# Patient Record
Sex: FEMALE | Race: WHITE
[De-identification: names, ages, dates, MRNs, and addresses within clinical notes are randomized per-mention and may not be internally consistent; named-entity substitution may affect disease eponyms.]

---

## 2022-01-25 ENCOUNTER — HOSPITAL ENCOUNTER (OUTPATIENT)
Dept: PHYSICAL THERAPY | Facility: CLINIC | Age: 47
Setting detail: THERAPIES SERIES
Discharge: HOME | End: 2022-01-25
Payer: COMMERCIAL

## 2022-01-25 PROCEDURE — 97161 PT EVAL LOW COMPLEX 20 MIN: CPT

## 2022-01-25 PROCEDURE — 97140 MANUAL THERAPY 1/> REGIONS: CPT

## 2022-01-25 PROCEDURE — 97110 THERAPEUTIC EXERCISES: CPT

## 2022-01-25 NOTE — CONSULTS
[x] [address]   Outpatient Rehabilitation &  Therapy  [address]  P: [phone]  F: [phone] [] [address]  P: [phone]  F: [phone] [] [address]  [facility]   [place]: [phone]  F: [phone]         Physical Therapy Running Evaluation    Date:  2022  Patient: Janneth Lund   : 1975  MRN: [id number]  Physician: Dr. Armas Job: [facility]  Medical Diagnosis:  L plantar Fasciitis  Rehab Codes: Onset date:  21   Next 's appt.: unknown    Subjective:   CC: Per Dr. Foy Home note. Patient states L foot pain since 2021. Patient states she did not injure foot. patient stands all day at work. patient states pain is keeping her from exercise. Patient is doing stretches for foot. Patient is taking advil sometimes for pain. Patient states pain is worse in morning but is constant. Today: Pt reports she had an injection. Mild relief but that has since worn off. Wearing night splint and that has helped some. Still pretty painful in the AM and as the day progresses. 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  Got custom orthotics and wear the in the wok shoes  Next goal race: was supposed do to [facility]    Pain:  [x] Yes  [] No   Location:  L foot Pain Rating: (0-10 scale) Pain altered Tx:  [] Yes  [x] No  Action:  Symptoms:  [] Improving [] Worsening [x] Same  Better:  [] Meds    [] Ice pack    [] Sit    [x] Not running  []Stand    [] Walk    [x] Stretching   [] Other:  Worse: [x] Run    [] Easy    [] Speed work    [x]Stand    [x] Walk    [] Stairs    [] Sit    [] Other:  Sleep: [x] OK    [] Disturbed    Objective:    ROM  ° A/P STRENGTH    Left Right Left Right   Hip Flex WNL WNL     Ext WNL WNL 4- 4   ER WNL WNL 4+ 5   IR WNL WNL     ABD WNL WNL 4 4+   Knee Flex WNL WNL 5 5   Ext WNL WNL     Ankle DF knee straight 0 10     PF WNL WNL     INV WNL WNL     EVER WNL WNL     GTE 50% limitation 40% liitation         OBSERVATION No Deficit Deficit Not Tested Comments   Palpation [] [x] [] Fibrotic L PF, gastroc and soleus spasm, tender medial calcaneal tubercle and center of heel   Gait [] [x] [] Analysis:  Antalgic with early heel off and decreased stance time on L       FUNCTIONAL TESTS  PAIN NO PAIN COMMENTS   Posterior tibialisdysfunction [x] [] Unknown too much pain to complete. Pt does have valgus collapse at midfoot   # of 1 legged calf raises   Too much pain to complete                         Functional Test: UWRI Score: 81% functionally impaired     Comments:  Assessment:Patient would benefit from skilled physical therapy services in order to: Increase ankle DF, GTE as well as hip and calf strength to correct LE mechanics and allow pt to return to run when cleared by Dr. Franco Yusuf  Problems:    [x] ? Pain:     [x] ? ROM:    [x] ? Strength:    [x] ? Function: Not able to run as she wishes   [] ?  Balance  [] Increased edema:  [] Postural Deviations  [x] Gait Deviations  [x]  UWRI score is 7/36  [] Other:      STG: (to be met in 8 treatments)  1. ? Pain: 3/10 pain foot to allow pt to start return to run  2. ? ROM:Ankle DF 10 deg knee straight and normal GTE to allow for normal LE mechanics  3. ? Strength: 5/5 hip strength, pt able to perform 20 reps of single leg HR to aide in correct LE mechanics  4. Pt to be able to walk normal and stand at work with 3/10 pain or less  5. Independent with Home Exercise Programs    LTG: (to be met in 16 treatments)  1. No pain L foot with running or cycling  2. Pt able to demonstrate acceptable mechanics with fwd impact lunge in a glute dominant fashion to aide in acceptable run mechanics  3. UWRI score to 30/36  4. Able to run as she wishes                    Patient goals:relieve pain so I can get back to training    Rehab Potential:  [x] Good  [] Fair  [] Poor   Suggested Professional Referral:  [x] No  [] Yes:  Barriers to Goal Achievement[de-identified]  [x] No  [] Yes:  Domestic Concerns:  [x] No  [] Yes:    Pt. Education:  [x] Plans/Goals, Risks/Benefits discussed  [x] Home exercise program    Method of Education: [x] Verbal  [x] Demo  [x] Written  Access Code: [email or web address]  URL: [email or web address]  Date: 01/25/2022  Prepared by: Kandi Buoy    Exercises  Seated Great Toe Extension - 1 x daily - 7 x weekly - 3 sets - 10 reps  Seated Lesser Toes Extension - 1 x daily - 7 x weekly - 3 sets - 10 reps  Arch Lifting - 1-2 x daily - 7 x weekly - 10 reps - 10sec hold  Supine Gastroc Stretch - 1-2 x daily - 7 x weekly - 1 sets - 2 reps - 1' hold  Gastroc Stretch on Wall - 1-2 x daily - 7 x weekly - 2 reps - 1' hold  Standing Soleus Stretch - 1-2 x daily - 7 x weekly - 2 reps - 1' hold  Seated Great Toe Extension - 1-2 x daily - 7 x weekly - 10 reps  Seated Heel Raise - 1-2 x daily - 7 x weekly - 3 sets - 10 reps    Comprehension of Education:  [x] Verbalizes understanding. [] Demonstrates understanding. [] Needs Review. [] Demonstrates/verbalizes understanding of HEP/Ed previously given.     Treatment Plan:  [x] Therapeutic Exercise    [x] Therapeutic Activity  [x] Manual Therapy   [x] Alter G treadmill  [x] Phys perf test     [x] Vasocompression/Game Ready   [x] Neuromuscular Re-education [x] Instruction in HEP       Frequency:  1-2x/week for 16 visits    Todays Treatment:  Manual:Hypervolt calf trigger points,MFR PF  Precautions: standard  Exercises:  Exercise  L Plantar Fascia Reps/ Time Weight/ Level Issued for HEP  Comments   Toe yoga x30  x x    Foot dome 10x10\"  x x    Burrito calf stretch 1'ea  x x gastroc and soleus   Strap calf stretch 1'  x x    Contract relax GTE 10x10\"  x x    Seated calf raise x20 35# x x                                                                                                                            Other:    Specific Instructions for next treatment: Hip strengthening, calf raises     Treatment Charges: Mins Units   [x] Evaluation       [x]  Low       []  Moderate       []  High 15 1   [] Phys perf test     [x]  Ther Exercise 20 1   [x]  Manual Therapy 15 1   []  Ther Activities     []  Aquatics     []  Vasocompression     []  NMR       TOTAL TREATMENT TIME: 50    Time in: 7455  Time Out:1805    Electronically signed by: Rafael Malave PT        Physician Signature:________________________________Date:__________________  By signing above or cosigning this note, I have reviewed this plan of care and certify a need for medically necessary rehabilitation services.      *PLEASE SIGN ABOVE AND FAX BACK ALL PAGES*

## 2022-01-27 ENCOUNTER — HOSPITAL ENCOUNTER (OUTPATIENT)
Dept: PHYSICAL THERAPY | Facility: CLINIC | Age: 47
Setting detail: THERAPIES SERIES
Discharge: HOME | End: 2022-01-27
Payer: COMMERCIAL

## 2022-01-27 PROCEDURE — 97110 THERAPEUTIC EXERCISES: CPT

## 2022-01-27 PROCEDURE — 97140 MANUAL THERAPY 1/> REGIONS: CPT

## 2022-01-27 NOTE — FLOWSHEET NOTE
[] [facility]) [facility] &  Therapy  [address]  [phone]  F: [phone] [] [address]  [address]   [address]  P: [phone]  F: [phone] [] [address] &  Therapy  [address]  P: [phone]  F: [phone] [] [address]  P: [phone]  F: [phone] [] [address]  [facility]   [address]   [place]: [phone]  F: [phone]      Physical Therapy Daily Treatment Note    Date:  2022  Patient Name:  Mandi Luz    :  1975  MRN: [id number]  Physician: Dr. Pavel Patrick: [facility] Diagnosis:   L plantar Fasciitis                    Rehab Codes: B88.703, R26.89  Onset date:    21                                    Next 's appt.: unknown  Visit# / total visits:     Cancels/No Shows: 0    Subjective:    Pain:  [x] Yes  [] No Location: L foot Pain Rating: (0-10 scale) 6/10  Pain altered Tx:  [x] No  [] Yes  Action:  Comments:I'm not as sore today but I haven't been on my feet as much.  Lower calf is in a knot  Objective:  Manual:Hypervolt calf trigger points  Precautions: standard  Exercises:  Exercise  L Plantar Fascia Reps/ Time Weight/ Level Issued for HEP   Comments   Toe yoga x30    x     Foot dome 10x10\"   x x     Burrito calf stretch 1'ea   x  gastroc and soleus   Strap calf stretch 3x1'   x x     Contract relax GTE 10x10\"   x x     Seated calf raise x20 35# x x      Resisted toe flex  x30 ea  orange x   x  1st, 2-5, 5th   Resisted supination  x20  lime  x  x      Sidelying hip abduction  2 sets    x  x  metronome at 90                                                                                                                                                                           Other:     Specific Instructions for next treatment: Wok into standing exercises if able         Treatment Charges: Mins Units   []  Modalities     [x]  Ther Exercise 30 2   [x]  Manual Therapy 15 1   []  Ther Activities     []  Aquatics     []  Vasocompression     []  Other     Total Treatment time 45 3       Assessment: [x] Progressing toward goals. PF is more pliable. GTE is normal. Gastroc and soleus are still in spasm with tenderness and limited DF available. Poor eccentric control of ponation Worked on this with resisted supination and attempted to improve propriocepion of what ankle neutral is.    [] No change. [] Other:  [] Patient would continue to benefit from skilled physical therapy services in order to: Increase ankle DF, GTE as well as hip and calf strength to correct LE mechanics and allow pt to return to run when cleared by Dr. Mustapha Benítez    STG: (to be met in 8 treatments)  1. ? Pain: 3/10 pain foot to allow pt to start return to run  2. ? ROM:Ankle DF 10 deg knee straight and normal GTE to allow for normal LE mechanics  3. ? Strength: 5/5 hip strength, pt able to perform 20 reps of single leg HR to aide in correct LE mechanics  4. Pt to be able to walk normal and stand at work with 3/10 pain or less  5. Independent with Home Exercise Programs     LTG: (to be met in 16 treatments)  1. No pain L foot with running or cycling  2. Pt able to demonstrate acceptable mechanics with fwd impact lunge in a glute dominant fashion to aide in acceptable run mechanics  3. UWRI score to 30/36  4. Able to run as she wishes                     Patient goals:relieve pain so I can get back to training    Pt. Education:  [x] Yes  [] No  [x] Reviewed Prior HEP/Ed  Method of Education: [x] Verbal  [x] Demo  [] Written  Comprehension of Education:  [] Verbalizes understanding. [] Demonstrates understanding. [] Needs review.   [] Demonstrates/verbalizes HEP/Ed previously given.     Plan: [x] Continue current frequency toward long and short term goals.     [] Specific Instructions for subsequent treatments:       Time In:1308            Time Out: 1336    Electronically signed by:  Cortney Pascal PT

## 2022-02-03 ENCOUNTER — HOSPITAL ENCOUNTER (OUTPATIENT)
Dept: PHYSICAL THERAPY | Facility: CLINIC | Age: 47
Setting detail: THERAPIES SERIES
End: 2022-02-03
Payer: COMMERCIAL

## 2022-02-08 ENCOUNTER — HOSPITAL ENCOUNTER (OUTPATIENT)
Dept: PHYSICAL THERAPY | Facility: CLINIC | Age: 47
Setting detail: THERAPIES SERIES
Discharge: HOME | End: 2022-02-08
Payer: COMMERCIAL

## 2022-02-08 PROCEDURE — 97140 MANUAL THERAPY 1/> REGIONS: CPT

## 2022-02-08 PROCEDURE — 97110 THERAPEUTIC EXERCISES: CPT

## 2022-02-08 NOTE — FLOWSHEET NOTE
[] [address]  [address]  [phone]  F: [phone] [] [address]  [address]   [address]  P: [phone]  F: [phone] [] [address]  Therapy  [address] [facility]  P: [phone]  F: [phone] [] [address]  P: [phone]  F: [phone] [] [address]  [facility]   [address]   [place]: [phone]  F: [phone]      Physical Therapy Daily Treatment Note    Date:  2022  Patient Name:  Juan F Yeboah    :  1975  MRN: [id number]  Physician: Dr. Juve [place]: [address] Diagnosis:   L plantar Fasciitis                    Rehab Codes: K84.390, R26.89  Onset date:    21                                    Next Dr's appt.: unknown  Visit# / total visits: 3/ 16    Cancels/No Shows: 0    Subjective:    Pain:  [x] Yes  [] No Location: L foot Pain Rating: (0-10 scale) 6/10  Pain altered Tx:  [x] No  [] Yes  Action:  Comments: Some times of less pain but still end of day and when I get up in AM terrible pain  Objective:  Manual:Hypervolt calf trigger points, MFR to Posterior tib,  QP and PF  Precautions: standard  Exercises:  Exercise  L Plantar Fascia Reps/ Time Weight/ Level Issued for HEP   Comments   Toe yoga x20    x     Foot dome 10x10\"   x x     Burrito calf stretch 1'ea   x  gastroc and soleus   Strap calf stretch 3x1'   x      Contract relax GTE 10x10\"   x x     Seated calf raise x20 35# x       Resisted toe flex  x30 ea  orange x   x  1st, 2-5, 5th   Resisted supination  x20  lime  x  x  standing    Sidelying hip abduction  2 sets    x    metronome at 90    DL heel raise  2x10    x  x  standing, cue to relax toes and not curl                                                                                                                                                             Other:     Specific Instructions for next treatment: Work into standing exercises if able         Treatment Charges: Mins Units   []  Modalities     [x]  Ther Exercise 30 2   [x]  Manual Therapy 15 1   []  Ther Activities     []  Aquatics     []  Vasocompression     []  Other     Total Treatment time 45 3       Assessment: [x] Progressing toward goals. Able to hold ankle in neutral while doing foot intrinsic exercises. Better control of great toe and lesser toes. Still living in overpronated position at foot. Added heel raises this date. First couple of reps pt had great difficulty controlling ankle and spins off onto lesser toes. Pt then able to keep ankle in neutral. Would like to be able to work out of shoes with inserts for calf raises. Pt is very painful with standing at this time though. [] No change. [] Other:  [] Patient would continue to benefit from skilled physical therapy services in order to: Increase ankle DF, GTE as well as hip and calf strength to correct LE mechanics and allow pt to return to run when cleared by Dr. Piyush Chong    STG: (to be met in 8 treatments)  1. ? Pain: 3/10 pain foot to allow pt to start return to run  2. ? ROM:Ankle DF 10 deg knee straight and normal GTE to allow for normal LE mechanics  3. ? Strength: 5/5 hip strength, pt able to perform 20 reps of single leg HR to aide in correct LE mechanics  4. Pt to be able to walk normal and stand at work with 3/10 pain or less  5. Independent with Home Exercise Programs     LTG: (to be met in 16 treatments)  1. No pain L foot with running or cycling  2. Pt able to demonstrate acceptable mechanics with fwd impact lunge in a glute dominant fashion to aide in acceptable run mechanics  3. UWRI score to 30/36  4.  Able to run as she wishes                     Patient goals:relieve pain so I can get back to training    Pt. Education:  [x] Yes  [] No  [x] Reviewed Prior HEP/Ed  Method of Education: [x] Verbal  [x] Demo  [] Written  Comprehension of Education:  [] Verbalizes understanding. [] Demonstrates understanding. [] Needs review. [] Demonstrates/verbalizes HEP/Ed previously given. Plan: [x] Continue current frequency toward long and short term goals.     [] Specific Instructions for subsequent treatments:       Time In:1810            Time Out: 1900    Electronically signed by:  Dottie Guerra PT

## 2022-02-15 ENCOUNTER — HOSPITAL ENCOUNTER (OUTPATIENT)
Dept: PHYSICAL THERAPY | Facility: CLINIC | Age: 47
Setting detail: THERAPIES SERIES
Discharge: HOME | End: 2022-02-15
Payer: COMMERCIAL

## 2022-02-15 PROCEDURE — 97140 MANUAL THERAPY 1/> REGIONS: CPT

## 2022-02-15 PROCEDURE — 97110 THERAPEUTIC EXERCISES: CPT

## 2022-02-15 PROCEDURE — 97016 VASOPNEUMATIC DEVICE THERAPY: CPT

## 2022-02-15 NOTE — FLOWSHEET NOTE
[] [facility]) - [facility] &  Therapy  [address]  [phone]  F: [phone] [] [address]  [address] [facility]   [address]  P: [phone]  F: [phone] [x] [address] &  Therapy  [address] [facility]  P: [phone]  F: [phone] [] [address]  P: [phone]  F: [phone] [] [address]  [facility]   [address]   [place]: [phone]  F: [phone]      Physical Therapy Daily Treatment Note    Date:  2/15/2022  Patient Name:  Mone Anguiano    :  1975  MRN: [id number]  Physician: Dr. Wilfrid Rea: [address]  Medical Diagnosis:   L plantar Fasciitis                    Rehab Codes: B11.521, R26.89  Onset date:    21                                    Next 's appt.: unknown  Visit# / total visits:     Cancels/No Shows: 0    Subjective:    Pain:  [x] Yes  [] No Location: L foot Pain Rating: (0-10 scale) 6/10  Pain altered Tx:  [x] No  [] Yes  Action:  Comments: Pt arrived in severe pain this date. She reports she forgot to wear her night splint and woke up in the AM very painful.  States that it really was feeling better prior to this  Objective:  Manual:Hypervolt calf trigger points, MFR to Posterior tib,  QP and PF  Precautions: standard  Exercises:  Exercise  L Plantar Fascia Reps/ Time Weight/ Level Issued for HEP   Comments   Toe yoga x20    x     Foot dome 10x10\"   x x     Burrito calf stretch 1'ea   x  gastroc and soleus   Strap calf stretch 3x1'   x x     Contract relax GTE 10x10\"   x x     Seated calf raise x20 35# x x      Resisted toe flex  x30 ea  orange x   x  1st, 2-5, 5th   Resisted supination  x20  lime  x  x  standing    Sidelying hip abduction  2 sets    x    metronome at 90    DL heel raise  2x10    x    standing, cue to relax toes and not curl                                                                                                                                                             Other:Vasocompression 34 deg medium pressure to foot and calf x15 min     Specific Instructions for next treatment: Work into standing exercises if able   First 1/2 of treatment performed by this writer while second half was performed by Guillermo Crawford PTA      Treatment Charges: Mins Units   []  Modalities     [x]  Ther Exercise 30 2   [x]  Manual Therapy 15 1   []  Ther Activities     []  Aquatics     [x]  Vasocompression 15 1   []  Other     Total Treatment time 60 4       Assessment: [x] Progressing toward goals. Pt was much tighter and more tender throughout calf and foot this date. She was tearful because she was so tender. Resting position on L vs R is on increased PF due to tissue being so tight. Pt is also visibly swollen at L posterior tib. [] No change. [] Other:  [] Patient would continue to benefit from skilled physical therapy services in order to: Increase ankle DF, GTE as well as hip and calf strength to correct LE mechanics and allow pt to return to run when cleared by Dr. Mustapha Benítez    STG: (to be met in 8 treatments)  1. ? Pain: 3/10 pain foot to allow pt to start return to run  2. ? ROM:Ankle DF 10 deg knee straight and normal GTE to allow for normal LE mechanics  3. ? Strength: 5/5 hip strength, pt able to perform 20 reps of single leg HR to aide in correct LE mechanics  4. Pt to be able to walk normal and stand at work with 3/10 pain or less  5. Independent with Home Exercise Programs     LTG: (to be met in 16 treatments)  1. No pain L foot with running or cycling  2. Pt able to demonstrate acceptable mechanics with fwd impact lunge in a glute dominant fashion to aide in acceptable run mechanics  3. UWRI score to 30/36  4.  Able to run as she wishes     Patient goals:relieve pain so I can get back to training    Pt. Education:  [x] Yes  [] No  [x] Reviewed Prior HEP/Ed  Method of Education: [x] Verbal  [x] Demo  [] Written  Comprehension of Education:  [] Verbalizes understanding. [] Demonstrates understanding. [] Needs review. [] Demonstrates/verbalizes HEP/Ed previously given. Plan: [x] Continue current frequency toward long and short term goals.     [] Specific Instructions for subsequent treatments:       Time In:1700            Time Out: 1800    Electronically signed by:  Kale Veronica PT

## 2022-02-15 NOTE — FLOWSHEET NOTE
[] [facility]) - [facility] &  Therapy  [address]  [phone]  F: [phone] [] [address]  [address]   [address]  P: [phone]  F: [phone] [x] [address] &  Therapy  [address] [facility]  P: [phone]  F: [phone] [] [address]  P: [phone]  F: [phone] [] [address]  [facility]   [address]   [place]: [phone]  F: [phone]      Physical Therapy Daily Treatment Note    Date:  2/15/2022  Patient Name:  Cayetano Berger    :  1975  MRN: [id number]  Physician: Dr. Chantal Ortiz: [facility] Diagnosis:   L plantar Fasciitis                    Rehab Codes: Z08.852, R26.89  Onset date:    21                                    Next 's appt.: unknown  Visit# / total visits:     Cancels/No Shows: 0    Subjective:    Pain:  [x] Yes  [] No Location: L foot Pain Rating: (0-10 scale) -/10  Pain altered Tx:  [x] No  [] Yes  Action:  Comments: Pt notes inc pain with visible swelling and TTP throughout posterior tib mm.      Objective:  Manual:Hypervolt calf trigger points, MFR to Posterior tib,  QP and PF  Precautions: standard  Exercises:  Exercise  L Plantar Fascia Reps/ Time Weight/ Level Issued for HEP   Comments   Toe yoga x20    x     Foot dome 10x10\"   x x     Burrito calf stretch 1'ea   x  gastroc and soleus   Strap calf stretch 3x1'   x      Contract relax GTE 10x10\"   x x     Seated calf raise x20 35# x x      Resisted toe flex  x30 ea  orange x  x  1st, 2-5, 5th   Resisted supination  x20  lime  x x  standing    Sidelying hip abduction  2 sets    x    metronome at 90    DL heel raise  2x10    x    standing, cue to relax toes and not curl                                                                                                                                                             Other: Vaso Ankle/knee sleeve, 10m 34* medium pressure      Specific Instructions for next treatment: Work into standing exercises if able         Treatment Charges: Mins Units   []  Modalities     [x]  Ther Exercise 15 1   [x]  Manual Therapy     []  Ther Activities     []  Aquatics     [x]  Vasocompression 10 1   []  Other     Total Treatment time 25 2       Assessment: [x] Progressing toward goals. Able to inc coordination of 5th resisted toe flexion with cues to allow passive extension and focus on active flexion. Verbal/tactile cueing provided to improve eccentric control of resisted supination. Concluded tx with vaso to reduce edema and soreness. [] No change. [] Other:  [] Patient would continue to benefit from skilled physical therapy services in order to: Increase ankle DF, GTE as well as hip and calf strength to correct LE mechanics and allow pt to return to run when cleared by Dr. Tim Herrera    STG: (to be met in 8 treatments)  1. ? Pain: 3/10 pain foot to allow pt to start return to run  2. ? ROM:Ankle DF 10 deg knee straight and normal GTE to allow for normal LE mechanics  3. ? Strength: 5/5 hip strength, pt able to perform 20 reps of single leg HR to aide in correct LE mechanics  4. Pt to be able to walk normal and stand at work with 3/10 pain or less  5. Independent with Home Exercise Programs     LTG: (to be met in 16 treatments)  1. No pain L foot with running or cycling  2. Pt able to demonstrate acceptable mechanics with fwd impact lunge in a glute dominant fashion to aide in acceptable run mechanics  3. UWRI score to 30/36  4. Able to run as she wishes                     Patient goals:relieve pain so I can get back to training    Pt.  Education:  [x] Yes  [] No  [x] Reviewed Prior HEP/Ed  Method of Education: [x] Verbal  [x] Demo  [] Written  Comprehension of Education:  [] Verbalizes understanding. [] Demonstrates understanding. [] Needs review. [] Demonstrates/verbalizes HEP/Ed previously given. Plan: [x] Continue current frequency toward long and short term goals.     [] Specific Instructions for subsequent treatments:       Time In: 5:30 pm            Time Out: 5:57 pm    Electronically signed by:  Fernanda Marie PTA

## 2022-02-24 ENCOUNTER — HOSPITAL ENCOUNTER (OUTPATIENT)
Dept: PHYSICAL THERAPY | Facility: CLINIC | Age: 47
Setting detail: THERAPIES SERIES
Discharge: HOME | End: 2022-02-24
Payer: COMMERCIAL

## 2022-02-24 PROCEDURE — 97016 VASOPNEUMATIC DEVICE THERAPY: CPT

## 2022-02-24 PROCEDURE — 97110 THERAPEUTIC EXERCISES: CPT

## 2022-02-24 PROCEDURE — 97140 MANUAL THERAPY 1/> REGIONS: CPT

## 2022-02-24 NOTE — FLOWSHEET NOTE
[] [facility]) [facility] &  Therapy  [address]  [phone]  F: [phone] [] [address]  [facility]   [address]  P: [phone]  F: [phone] [x] [address] &  Therapy  [address]  P: [phone]  F: [phone] [] [address]  P: [phone]  F: [phone] [] [address]  [facility]   [address]   [place]: [phone]  F: [phone]      Physical Therapy Daily Treatment Note    Date:  2022  Patient Name:  Yuridia Goldberg    :  1975  MRN: [id number]  Physician: Dr. Obed Rehman: Joaquin Ruffin  Medical Diagnosis:   L plantar Fasciitis                    Rehab Codes: D15.564, R26.89  Onset date:    21                                    Next Dr's appt.: unknown  Visit# / total visits:     Cancels/No Shows: 0    Subjective:    Pain:  [x] Yes  [] No Location: L foot Pain Rating: (0-10 scale) 2/10  Pain altered Tx:  [x] No  [] Yes  Action:  Comments:Patient reports feeling much better than last session. She is having mild dull ache with foot muscle tightness no shooting sharp pain. Patient reports that she has been completing heel raises barefoot and is not having pain.     Objective:  Manual:Hypervolt calf trigger points, MFR to Posterior tib,  QP and PF  Precautions: standard  Exercises:  Exercise  L Plantar Fascia Reps/ Time Weight/ Level Issued for HEP   Comments   Toe yoga x20    x  standing   Foot dome 10x10\"   x x standing   Burrito calf stretch 1'ea   x x gastroc and soleus   Strap calf stretch 3x1'   x      Contract relax GTE 10x10\"   x x     Seated calf raise x20 35# x x     Resisted toe flex  3\"x30 ea  orange x   x  1st, 2-5, 5th 5 standing   Resisted supination  x20  lime  x  x  standing    Sidelying hip abduction  2 sets    x    metronome at 90    DL heel raise  2x10    x  x  standing, cue to relax toes and not curl                                 Other:Vasocompression 34 deg medium pressure to foot and calf x15 min     Specific Instructions for next treatment: Work into standing exercises if able     Treatment Charges: Mins Units   []  Modalities     [x]  Ther Exercise 30 2   [x]  Manual Therapy 15 1   []  Ther Activities     []  Aquatics     [x]  Vasocompression 15 1   []  Other     Total Treatment time 60 4       Assessment: [x] Progressing toward goals. Patient demonstrated increased tightness in medial gastroc and posterior tib compared to R. Continued with above foot stabilization exercises completing half of each exercise in standing  with VC's to maintain tripod foot position. Patient reported minimal soreness but overall felt good. Reviewed heel raises with patient as she progressed to barefoot. VC's were given to only lift as high as she is able to maintain extended toes and maintain wt through all METS. Continued with vaso to prevent tissue irritation    [] No change. [] Other:  [] Patient would continue to benefit from skilled physical therapy services in order to: Increase ankle DF, GTE as well as hip and calf strength to correct LE mechanics and allow pt to return to run when cleared by Dr. Julian Parker    STG: (to be met in 8 treatments)  1. ? Pain: 3/10 pain foot to allow pt to start return to run  2. ? ROM:Ankle DF 10 deg knee straight and normal GTE to allow for normal LE mechanics  3. ? Strength: 5/5 hip strength, pt able to perform 20 reps of single leg HR to aide in correct LE mechanics  4. Pt to be able to walk normal and stand at work with 3/10 pain or less  5. Independent with Home Exercise Programs     LTG: (to be met in 16 treatments)  1. No pain L foot with running or cycling  2.  Pt able to demonstrate acceptable mechanics with fwd impact lunge in a glute dominant fashion to aide in acceptable run mechanics  3. UWRI score to 30/36  4. Able to run as she wishes                     Patient goals:relieve pain so I can get back to training    Pt. Education:  [x] Yes  [] No  [x] Reviewed Prior HEP/Ed  Method of Education: [x] Verbal  [x] Demo  [] Written  Comprehension of Education:  [] Verbalizes understanding. [] Demonstrates understanding. [] Needs review. [] Demonstrates/verbalizes HEP/Ed previously given. Plan: [x] Continue current frequency toward long and short term goals.     [] Specific Instructions for subsequent treatments:       Time In:4pm            Time Out: 515pm    Electronically signed by:  Debo Armenta PT

## 2022-03-01 ENCOUNTER — HOSPITAL ENCOUNTER (OUTPATIENT)
Dept: PHYSICAL THERAPY | Facility: CLINIC | Age: 47
Setting detail: THERAPIES SERIES
Discharge: HOME | End: 2022-03-01
Payer: COMMERCIAL

## 2022-03-01 PROCEDURE — 97016 VASOPNEUMATIC DEVICE THERAPY: CPT

## 2022-03-01 PROCEDURE — 97140 MANUAL THERAPY 1/> REGIONS: CPT

## 2022-03-01 PROCEDURE — 97110 THERAPEUTIC EXERCISES: CPT

## 2022-03-01 NOTE — FLOWSHEET NOTE
[] [facility]) [facility] &  Therapy  [address]  [phone]  F: [phone] [] [address]  [employer] [address]   [address]  P: [phone]  F: [phone] [x] [address] &  Therapy  [address] [facility]  P: [phone]  F: [phone] [] [address]  P: [phone]  F: [phone] [] [address]  [facility]   [address]   [place]: [phone]  F: [phone]      Physical Therapy Daily Treatment Note    Date:  3/1/2022  Patient Name:  Soco Brooks    :  1975  MRN: [id number]  Physician: Dr. Daiana Sales: Joaquin Lu 150  Medical Diagnosis:   L plantar Fasciitis                    Rehab Codes: E24.341, R26.89  Onset date:    21                                    Next Dr's appt.: unknown  Visit# / total visits:     Cancels/No Shows: 0    Subjective:    Pain:  [x] Yes  [] No Location: L foot Pain Rating: (0-10 scale) 0-2/10  Pain altered Tx:  [x] No  [] Yes  Action:  Comments:Patient reports feeling much better. Have been doing HEP regularly. Can almost walk normal. Still getting intermittent pain. AM pain much better. Pt reports she went to Dr. Ayo Pacheco and she is cleared to start regular walking.   Objective:  Manual:Hypervolt calf trigger points, MFR to Posterior tib,  QP and PF  Precautions: standard  Exercises:  Exercise  L Plantar Fascia Reps/ Time Weight/ Level Issued for HEP   Comments   Toe yoga x20    x  standing   Foot dome 10x10\"   x x standing   Burrito calf stretch 1'ea   x x gastroc and soleus   Strap calf stretch 3x1'   x      Contract relax GTE 10x10\"   x x     Seated calf raise x20 44# x x     Resisted toe flex  3\"x30 ea  orange x   x  1st, 2-5, 5th 5 standing   Resisted supination  x20  lime  x  x  standing    DL heel raise  2x10    x  x  standing, cue to relax toes and not curl    Hip hike foam roller at wall  2 sets    x  x                     Other:Vasocompression 34 deg medium pressure to foot and calf x15 min     Specific Instructions for next treatment: Work into standing exercises if able     Treatment Charges: Mins Units   []  Modalities     [x]  Ther Exercise 30 2   [x]  Manual Therapy 15 1   []  Ther Activities     []  Aquatics     [x]  Vasocompression 15 1   []  Other     Total Treatment time 60 4       Assessment: [x] Progressing toward goals. Pt continues with spasm and tenderness at posterior tib. Improved control of ankle holding neutral position with exercises and improved eccentric control with foot supination exercise. Added hip hike at wall with foam roller. Pt did well with loading great toe and not rolling to lateral column of foot. [] No change. [] Other:  [] Patient would continue to benefit from skilled physical therapy services in order to: Increase ankle DF, GTE as well as hip and calf strength to correct LE mechanics and allow pt to return to run when cleared by Dr. Cherelle Segura    STG: (to be met in 8 treatments)  1. ? Pain: 3/10 pain foot to allow pt to start return to run  2. ? ROM:Ankle DF 10 deg knee straight and normal GTE to allow for normal LE mechanics  3. ? Strength: 5/5 hip strength, pt able to perform 20 reps of single leg HR to aide in correct LE mechanics  4. Pt to be able to walk normal and stand at work with 3/10 pain or less  5. Independent with Home Exercise Programs     LTG: (to be met in 16 treatments)  1. No pain L foot with running or cycling  2. Pt able to demonstrate acceptable mechanics with fwd impact lunge in a glute dominant fashion to aide in acceptable run mechanics  3. UWRI score to 30/36  4. Able to run as she wishes                     Patient goals:relieve pain so I can get back to training    Pt.  Education:  [x] Yes  [] No  [x] Reviewed Prior HEP/Ed  Method of Education: [x] Verbal  [x] Demo  [] Written  Comprehension of Education:  [] Verbalizes understanding. [] Demonstrates understanding. [] Needs review. [] Demonstrates/verbalizes HEP/Ed previously given. Plan: [x] Continue current frequency toward long and short term goals.     [] Specific Instructions for subsequent treatments:       Time In:1755            Time Out: 1905    Electronically signed by:  Jamel Garcia PT

## 2022-03-17 ENCOUNTER — HOSPITAL ENCOUNTER (OUTPATIENT)
Dept: PHYSICAL THERAPY | Facility: CLINIC | Age: 47
Setting detail: THERAPIES SERIES
Discharge: HOME | End: 2022-03-17
Payer: COMMERCIAL

## 2022-03-17 PROCEDURE — 97140 MANUAL THERAPY 1/> REGIONS: CPT

## 2022-03-17 PROCEDURE — 97016 VASOPNEUMATIC DEVICE THERAPY: CPT

## 2022-03-17 PROCEDURE — 97110 THERAPEUTIC EXERCISES: CPT

## 2022-03-17 NOTE — FLOWSHEET NOTE
[] [address]  - [facility] &  Therapy  [address]  [phone]  F: [phone] [] [address]  [place] [address]   [address]  P: [phone]  F: [phone] [x] [facility] &  Therapy  [address] [facility]  P: [phone]  F: [phone] [] [address]  P: [phone]  F: [phone] [] [address]  [facility]   [address]   [place]: [phone]  F: [phone]      Physical Therapy Daily Treatment Note    Date:  3/17/2022  Patient Name:  Juan F Yeboah    :  1975  MRN: [id number]  Physician: Dr. Juve [place]: Joaquin Lu 150  Medical Diagnosis:   L plantar Fasciitis                    Rehab Codes: E89.772, R26.89  Onset date:    21                                    Next 's appt.: unknown  Visit# / total visits:     Cancels/No Shows: 0    Subjective:    Pain:  [x] Yes  [] No Location: L foot Pain Rating: (0-10 scale)  3/10  Pain altered Tx:  [x] No  [] Yes  Action:  Comments: Patient returns from vacation and reports that she did pretty good. She  was able to modify walking excursions however, she did have increased pain and edema around ankle and up into calf.       Objective:  Manual:Hypervolt calf trigger points, MFR to Posterior tib, QP and PF  Precautions: standard  Exercises:  Exercise  L Plantar Fascia Reps/ Time Weight/ Level Issued for HEP   Comments   Toe yoga x20      standing   Foot dome 10x10\"   x x standing   LAX w/ big toe stretch 2x90'   x    triplanar calf stretch    x gastroc and soleus   Burrito calf stretch 1'ea   x  gastroc and soleus   Strap calf stretch 3x1'   x      Contract relax GTE 10x10\"   x      Seated calf raise x20 45# x x     Resisted toe flex  3\"x30 ea  orange x    1st, 2-5, 5th 5 standing   Resisted supination  x20  lime  x   standing    DL heel raise  2x10    x   standing, cue to relax toes and not curl    Hip hike foam roller at wall  2 sets    x x                     Other:Vasocompression 34 deg medium pressure to foot and calf x15 min     Specific Instructions for next treatment: Work into standing exercises if able     Treatment Charges: Mins Units   []  Modalities     [x]  Ther Exercise 15 1   [x]  Manual Therapy 20 1   []  Ther Activities     []  Aquatics     [x]  Vasocompression 15 1   []  Other     Total Treatment time 50 3       Assessment: [x] Progressing toward goals. [] No change. [x] Other:Patient demonstrates edema throughout L ankle into calf, with mild antalgic gait. Added dynamic stretch to assist with edema pump and 1st MTP stretch. Ending with vaso to further reduce swelling. Patient reported feeling better post treatment. [] Patient would continue to benefit from skilled physical therapy services in order to: Increase ankle DF, GTE as well as hip and calf strength to correct LE mechanics and allow pt to return to run when cleared by Dr. Dick Aggarwal    STG: (to be met in 8 treatments)  1. ? Pain: 3/10 pain foot to allow pt to start return to run  2. ? ROM:Ankle DF 10 deg knee straight and normal GTE to allow for normal LE mechanics  3. ? Strength: 5/5 hip strength, pt able to perform 20 reps of single leg HR to aide in correct LE mechanics  4. Pt to be able to walk normal and stand at work with 3/10 pain or less  5. Independent with Home Exercise Programs     LTG: (to be met in 16 treatments)  1. No pain L foot with running or cycling  2. Pt able to demonstrate acceptable mechanics with fwd impact lunge in a glute dominant fashion to aide in acceptable run mechanics  3. UWRI score to 30/36  4. Able to run as she wishes                     Patient goals:relieve pain so I can get back to training    Pt.  Education:  [x] Yes  [] No  [x] Reviewed Prior HEP/Ed  Method of Education: [x] Verbal  [x] Demo  [] Written  Comprehension of Education:  [] Verbalizes understanding. [] Demonstrates understanding. [] Needs review. [] Demonstrates/verbalizes HEP/Ed previously given. Plan: [x] Continue current frequency toward long and short term goals.           Time In:6pm           Time Out: 7pm    Electronically signed by:  Emilie Johns PT

## 2022-03-22 ENCOUNTER — HOSPITAL ENCOUNTER (OUTPATIENT)
Dept: PHYSICAL THERAPY | Facility: CLINIC | Age: 47
Setting detail: THERAPIES SERIES
Discharge: HOME | End: 2022-03-22
Payer: COMMERCIAL

## 2022-03-22 PROCEDURE — 97110 THERAPEUTIC EXERCISES: CPT

## 2022-03-22 PROCEDURE — 97140 MANUAL THERAPY 1/> REGIONS: CPT

## 2022-03-22 PROCEDURE — 97016 VASOPNEUMATIC DEVICE THERAPY: CPT

## 2022-03-29 ENCOUNTER — HOSPITAL ENCOUNTER (OUTPATIENT)
Dept: PHYSICAL THERAPY | Facility: CLINIC | Age: 47
Setting detail: THERAPIES SERIES
Discharge: HOME | End: 2022-03-29
Payer: COMMERCIAL

## 2022-03-29 PROCEDURE — 97110 THERAPEUTIC EXERCISES: CPT

## 2022-03-29 PROCEDURE — 97140 MANUAL THERAPY 1/> REGIONS: CPT

## 2022-03-29 NOTE — FLOWSHEET NOTE
toes on folded towel  2x10    x   standing, cue to relax toes and not curl    Hip hike foam roller at wall  2 sets    x x      MOBO foot rocks  x20ea  1/3,2/4     PWB contralateral LE on 2inch step   Resisted ankle EV seated 3x10 lime x x    Seated foot self stretch with fingers interlaced with toes 10CW/10 CCW         Other:     Specific Instructions for next treatment: If less pain then see if Physician will clear for AlterG    Treatment Charges: Mins Units   []  Modalities     [x]  Ther Exercise 35 2   [x]  Manual Therapy 15 1   []  Ther Activities     []  Aquatics     []  Vasocompression     []  Other     Total Treatment time 50 3       Assessment: [x] Progressing toward goals. Pt is doing a much better job of keeping ankle neutral. She is in a neutral, cushioned shoe and is getting some peroneal pain after walking. Recommended shorter walks with increased frequency and added ankle eversion resisted. Pt is very tight at EDL and peroneals. She is demonstrating early heel off and extensor substitution to clear foot in swing. Added resisted EV to aide in peroneal function       [x] Other:Patient Is improving ability to hold neutral ankle throughout exercises. She is still having difficulty controlling pronation with supination exercise. SHe is to come back to neutral at the ankle and instead moves past neutral and requires manual cues. Attempted to move to standing for soleus strengthening but this provoked pain so reverted to heavier load with seated. [] Patient would continue to benefit from skilled physical therapy services in order to:  Increase ankle DF, GTE as well as hip and calf strength to correct LE mechanics and allow pt to return to run when cleared by Dr. Mustapha Benítez    STG: (to be met in 8 treatments)  1. ? Pain: 3/10 pain foot to allow pt to start return to run  2. ? ROM:Ankle DF 10 deg knee straight and normal GTE to allow for normal LE mechanics  3. ? Strength: 5/5 hip strength, pt able to perform 20 reps of single leg HR to aide in correct LE mechanics  4. Pt to be able to walk normal and stand at work with 3/10 pain or less  5. Independent with Home Exercise Programs     LTG: (to be met in 16 treatments)  1. No pain L foot with running or cycling  2. Pt able to demonstrate acceptable mechanics with fwd impact lunge in a glute dominant fashion to aide in acceptable run mechanics  3. UWRI score to 30/36  4. Able to run as she wishes                     Patient goals:relieve pain so I can get back to training    Pt. Education:  [x] Yes  [] No  [x] Reviewed Prior HEP/Ed  Method of Education: [x] Verbal  [x] Demo  [] Written  Comprehension of Education:  [] Verbalizes understanding. [] Demonstrates understanding. [] Needs review. [] Demonstrates/verbalizes HEP/Ed previously given. Plan: [x] Continue current frequency toward long and short term goals.           Time In:6pm           Time Out: 7pm    Electronically signed by:  Jose David Grullon, PT

## 2022-04-05 ENCOUNTER — HOSPITAL ENCOUNTER (OUTPATIENT)
Dept: PHYSICAL THERAPY | Facility: CLINIC | Age: 47
Setting detail: THERAPIES SERIES
Discharge: HOME | End: 2022-04-05
Payer: COMMERCIAL

## 2022-04-05 NOTE — FLOWSHEET NOTE
[] [facility] &  Therapy  [address]    [phone]  F: [phone]   [] [address]  [facility] [address]   [address]  P: [phone]  F: [phone]  [] [address] &  Therapy  [address]  P: [phone]  F: [phone] [] [address]  P: [phone]  F: [phone]  [] [address]  [facility]   [address]   [place]: [phone]  F: [phone]   [] [place] [address] [facility] [address]  [place]: [phone]   F: [phone]     Physical Therapy Cancel/No Show note    Date: 2022  Patient: Tip Rodriguez  : 1975  MRN: [id number]    Cancels/No Shows to date:     For today's appointment patient:    [x]  Cancelled    [] Rescheduled appointment    [] No-show     Reason given by patient:    []  Patient ill    []  Conflicting appointment    [] No transportation      [] Conflict with work    [x] No reason given    [] Weather related    [] [id number]    [] Other:      Comments:        [] Next appointment was confirmed    Electronically signed by: Julian Rdz

## 2022-04-12 ENCOUNTER — HOSPITAL ENCOUNTER (OUTPATIENT)
Dept: PHYSICAL THERAPY | Facility: CLINIC | Age: 47
Setting detail: THERAPIES SERIES
Discharge: HOME | End: 2022-04-12
Payer: COMMERCIAL

## 2022-04-12 NOTE — FLOWSHEET NOTE
[] [facility]) [facility] &  Therapy  [address]    [phone]  F: [phone]   [] [address]  [facility] 36   [address]  P: [phone]  F: [phone]  [] [address] &  Therapy  [address] [facility] [facility] [address]  P: [phone]  F: [phone] [] [address]  P: [phone]  F: [phone]  [] [address] [facility]   [address]   [place]: [phone]  F: [phone]   [] [place] [address] [facility] [address]  [place]: [phone]   F: [phone]     Physical Therapy Cancel/No Show note    Date: 2022  Patient: Miguelina Martinez  : 1975  MRN: [id number]    Cancels/No Shows to date:     For today's appointment patient:    [x]  Cancelled    [] Rescheduled appointment    [] No-show     Reason given by patient:    []  Patient ill    []  Conflicting appointment    [] No transportation      [x] Conflict with work    [] No reason given    [] Weather related    [] [id number]    [] Other:      Comments:        [] Next appointment was confirmed    Electronically signed by: Dona Robertson

## 2022-04-21 ENCOUNTER — HOSPITAL ENCOUNTER (OUTPATIENT)
Dept: PHYSICAL THERAPY | Facility: CLINIC | Age: 47
Setting detail: THERAPIES SERIES
Discharge: HOME | End: 2022-04-21
Payer: COMMERCIAL

## 2022-04-21 PROCEDURE — 97110 THERAPEUTIC EXERCISES: CPT

## 2022-04-21 NOTE — FLOWSHEET NOTE
[] [facility]) [facility] &  Therapy  [address]  [phone]  F: [phone] [] [address]  [employer] [address]   [address]  P: [phone]  F: [phone] [x] [address] &  Therapy  [address] [facility]  P: [phone]  F: [phone] [] [address]  P: [phone]  F: [phone] [] [address]  [facility]   [address]   [place]: [phone]  F: [phone]      Physical Therapy Daily Treatment Note    Date:  2022  Patient Name:  Macho Parr    :  1975  MRN: [id number]  Physician: Dr. Arturo Flores: Alfredo Asher Diagnosis:   L plantar Fasciitis                    Rehab Codes: N45.427, R26.89  Onset date:    21                                    Next 's appt.: unknown  Visit# / total visits:     Cancels/No Shows: 0    Subjective:    Pain:  [x] Yes  [] No Location: L foot Pain Rating: (0-10 scale)  0/10  Pain altered Tx:  [x] No  [] Yes  Action:  Comments: Patient reports she continues to get better. Just a small amount of pain on the top of the L foot now.   Objective:  Manual:DI hip flexor  Precautions: standard  Exercises:  Exercise  L Plantar Fascia Reps/ Time Weight/ Level Issued for HEP   Comments   Supine hip flexor stretch 2'ea  x x    Toe yoga x20   x   standing   Foot dome 10x10\"   x  standing   LAX w/ big toe stretch 2x90'       Toe raises x20   x Buttocks against wall   Burrito calf stretch 1'ea   x  gastroc and soleus   Strap calf stretch 3x1'   x      Contract relax GTE 10x10\"   x      Seated calf raise x20 50# x      Resisted toe flex  3\"x30 ea lime x    1st, 2-5, 5th 5 standing   Resisted supination  x2' blue  x x  standing    DL heel raise ball at heels  2x10    x x    Hip hike  2 sets  4\"  x x      MOBO foot rocks  x20ea  1/3,2/4    x PWB contralateral LE on 2inch step   Resisted ankle EV seated 3x10 lime x     Seated foot self stretch with fingers interlaced with toes 10CW/10 CCW         Other:     Specific Instructions for next treatment:Run in AlterG if cleared by Dr. Evangelist Ng at 5/5 appt    Treatment Charges: Mins Units   []  Modalities     [x]  Ther Exercise 45 3   [x]  Manual Therapy 5 NC   []  Ther Activities     []  Aquatics     []  Vasocompression     []  Other     Total Treatment time 50 3       Assessment: [x] Progressing toward goals. Filmed pt walking. She is demonstrating good foot and ankle control but then deviates in frontal plane at the hip causing supination and loading of lateral column of the foot. Pt is also pulling up strongly into DF with toes on the L in swing which may explain irritation at EDL and anterior tib. [x] Other:Patient Is improving ability to hold neutral ankle throughout exercises. She is still having difficulty controlling pronation with supination exercise. SHe is to come back to neutral at the ankle and instead moves past neutral and requires manual cues. Attempted to move to standing for soleus strengthening but this provoked pain so reverted to heavier load with seated. [] Patient would continue to benefit from skilled physical therapy services in order to: Increase ankle DF, GTE as well as hip and calf strength to correct LE mechanics and allow pt to return to run when cleared by Dr. Evangelist Ng    STG: (to be met in 8 treatments)  1. ? Pain: 3/10 pain foot to allow pt to start return to run  2. ? ROM:Ankle DF 10 deg knee straight and normal GTE to allow for normal LE mechanics  3. ? Strength: 5/5 hip strength, pt able to perform 20 reps of single leg HR to aide in correct LE mechanics  4. Pt to be able to walk normal and stand at work with 3/10 pain or less  5.  Independent with Home Exercise Programs     LTG: (to be met in 16 treatments)  1. No pain L foot with running or cycling  2. Pt able to demonstrate acceptable mechanics with fwd impact lunge in a glute dominant fashion to aide in acceptable run mechanics  3. UWRI score to 30/36  4. Able to run as she wishes                     Patient goals:relieve pain so I can get back to training    Pt. Education:  [x] Yes  [] No  [x] Reviewed Prior HEP/Ed  Method of Education: [x] Verbal  [x] Demo  [] Written  Comprehension of Education:  [] Verbalizes understanding. [] Demonstrates understanding. [] Needs review. [] Demonstrates/verbalizes HEP/Ed previously given. Plan: [x] Continue current frequency toward long and short term goals.           Time In:6 05pm           Time Out: 655pm    Electronically signed by:  Kanwal Rodriguez PT

## 2022-05-10 ENCOUNTER — HOSPITAL ENCOUNTER (OUTPATIENT)
Dept: PHYSICAL THERAPY | Facility: CLINIC | Age: 47
Setting detail: THERAPIES SERIES
Discharge: HOME | End: 2022-05-10
Payer: COMMERCIAL

## 2022-05-10 PROCEDURE — 97110 THERAPEUTIC EXERCISES: CPT

## 2022-05-10 NOTE — FLOWSHEET NOTE
[] [address]  - [facility] TWELVE-STEP [facility] &  Therapy  [address]  [phone]  F: [phone] [] [address]  [facility]   [address]  P: [phone]  F: [phone] [x] [address] &  Therapy  [address]  P: [phone]  F: [phone] [] [address]  P: [phone]  F: [phone] [] [address]  [facility]   [address]   [place]: [phone]  F: [phone]      Physical Therapy Daily Treatment Note    Date:  5/10/2022  Patient Name:  Bernardo Rizvi    :  1975  MRN: [id number]  Physician: Dr. Jose Aguila: [facility] Diagnosis:   L plantar Fasciitis                    Rehab Codes: Q30.174, R26.89  Onset date:    21                                    Next 's appt.: unknown  Visit# / total visits:     Cancels/No Shows: 0    Subjective:    Pain:  [x] Yes  [] No Location: L foot Pain Rating: (0-10 scale)  0/10  Pain altered Tx:  [x] No  [] Yes  Action:  Comments: Pt reports pain in top of (L) foot worsens with shoes recommended. No pain rating given this date. Arrives in work shoes this date, stating these are the most comfortable for her. Pt reports she did see Dr. Jorge Pineda since last time and has been cleared to start running.   Objective:  Manual:DI hip flexor  Precautions: standard  Exercises:  Exercise  L Plantar Fascia Reps/ Time Weight/ Level Issued for HEP   Comments   AlterG walk x15' 2.7   XL shorts    Supine hip flexor stretch 3x30\" ea  x x Standing 5/10   Seated figure 4 stretch     x    Glut bridge x20   x    Dead bug x20   **    Toe yoga x20   x   standing   Foot dome 10x10\"   x  standing   LAX w/ big toe stretch 2x90'       Toe raises x20   x Buttocks against wall   Burrito calf stretch 1'ea   x  gastroc and soleus   Strap calf stretch 3x1'   x      Contract relax GTE 10x10\"   x      Seated calf raise x20 50# x      Resisted toe flex  3\"x30 ea lime x    1st, 2-5, 5th 5 standing   Resisted supination  x2' blue  x   standing    DL heel raise ball at heels  2x10    x x     Sit to stand 2x10   x     Hip hike  2 sets  4\"  x       MOBO foot rocks  x20ea  1/3,2/4    PWB contralateral LE on 2inch step   Resisted ankle EV seated 3x10 lime x     Seated foot self stretch with fingers interlaced with toes 10CW/10 CCW         Other:     Specific Instructions for next treatment: Run in [employer]     Treatment Charges: Mins Units   []  Modalities     [x]  Ther Exercise 40 3   []  Manual Therapy     []  Ther Activities     []  Aquatics     []  Vasocompression     []  Other     Total Treatment time 40 3       Assessment: [x] Progressing toward goals. Initiated AlterG this date for warm-up, d/t current footwear held initiation of running per primary PT. Plan to trial inserts in shoes per primary PT. Modified hip flexor stretch to standing variation with verbal and visual cueing to improve neutral pelvic alignment. Added glut bridge, sit to stand and dead bug ex with cues to maintain neutral pelvic alignment and inc TvA activation. Fair tolerance to additional strengthening. Pt notes she may need to back off to less frequent visits d/t insurance. [] Other:   [] Patient would continue to benefit from skilled physical therapy services in order to: Increase ankle DF, GTE as well as hip and calf strength to correct LE mechanics and allow pt to return to run when cleared by Dr. Juvenal Wang    STG: (to be met in 8 treatments)  ? Pain: 3/10 pain foot to allow pt to start return to run  ? ROM:Ankle DF 10 deg knee straight and normal GTE to allow for normal LE mechanics  ?  Strength: 5/5 hip strength, pt able to perform 20 reps of single leg HR to aide in correct LE mechanics  Pt to be able to walk normal and stand at work with 3/10 pain or less  Independent with Home Exercise Programs     LTG: (to be met in 16 treatments)  No pain L foot with running or cycling  Pt able to demonstrate acceptable mechanics with fwd impact lunge in a glute dominant fashion to aide in acceptable run mechanics  UWRI score to 30/36  Able to run as she wishes                     Patient goals:relieve pain so I can get back to training    Pt. Education:  [x] Yes  [] No  [x] Reviewed Prior HEP/Ed  Method of Education: [x] Verbal  [x] Demo  [] Written  Comprehension of Education:  [] Verbalizes understanding. [] Demonstrates understanding. [] Needs review. [] Demonstrates/verbalizes HEP/Ed previously given. Plan: [x] Continue current frequency toward long and short term goals.           Time In: 6:05pm           Time Out: 7:03 pm    Electronically signed by:  Lenora Zavala PTA

## 2022-05-17 ENCOUNTER — APPOINTMENT (OUTPATIENT)
Dept: PHYSICAL THERAPY | Facility: CLINIC | Age: 47
End: 2022-05-17
Payer: COMMERCIAL